# Patient Record
Sex: MALE | Race: BLACK OR AFRICAN AMERICAN | Employment: FULL TIME | URBAN - METROPOLITAN AREA
[De-identification: names, ages, dates, MRNs, and addresses within clinical notes are randomized per-mention and may not be internally consistent; named-entity substitution may affect disease eponyms.]

---

## 2024-05-08 ENCOUNTER — APPOINTMENT (OUTPATIENT)
Dept: GENERAL RADIOLOGY | Age: 49
End: 2024-05-08
Payer: COMMERCIAL

## 2024-05-08 ENCOUNTER — HOSPITAL ENCOUNTER (EMERGENCY)
Age: 49
Discharge: HOME | End: 2024-05-08
Attending: EMERGENCY MEDICINE
Payer: COMMERCIAL

## 2024-05-08 VITALS
WEIGHT: 170 LBS | BODY MASS INDEX: 27.32 KG/M2 | TEMPERATURE: 98.2 F | OXYGEN SATURATION: 96 % | HEIGHT: 66 IN | RESPIRATION RATE: 19 BRPM | DIASTOLIC BLOOD PRESSURE: 107 MMHG | SYSTOLIC BLOOD PRESSURE: 158 MMHG | HEART RATE: 75 BPM

## 2024-05-08 DIAGNOSIS — E86.9 VOLUME DEPLETION: ICD-10-CM

## 2024-05-08 DIAGNOSIS — M62.82 NON-TRAUMATIC RHABDOMYOLYSIS: Primary | ICD-10-CM

## 2024-05-08 LAB
ALBUMIN SERPL-MCNC: 3.9 G/DL (ref 3.5–5)
ALBUMIN/GLOB SERPL: 1.3 (ref 1–1.9)
ALP SERPL-CCNC: 61 U/L (ref 40–129)
ALT SERPL-CCNC: 24 U/L (ref 12–65)
ANION GAP SERPL CALC-SCNC: 14 MMOL/L (ref 9–18)
AST SERPL-CCNC: 38 U/L (ref 15–37)
BASOPHILS # BLD: 0 K/UL (ref 0–0.2)
BASOPHILS NFR BLD: 0 % (ref 0–2)
BILIRUB SERPL-MCNC: 0.9 MG/DL (ref 0–1.2)
BUN SERPL-MCNC: 11 MG/DL (ref 6–23)
CALCIUM SERPL-MCNC: 9.3 MG/DL (ref 8.8–10.2)
CHLORIDE SERPL-SCNC: 103 MMOL/L (ref 98–107)
CK SERPL-CCNC: 1416 U/L (ref 21–215)
CO2 SERPL-SCNC: 21 MMOL/L (ref 20–28)
CREAT SERPL-MCNC: 1.3 MG/DL (ref 0.8–1.3)
DIFFERENTIAL METHOD BLD: ABNORMAL
EKG ATRIAL RATE: 77 BPM
EKG DIAGNOSIS: NORMAL
EKG P AXIS: 62 DEGREES
EKG P-R INTERVAL: 137 MS
EKG Q-T INTERVAL: 364 MS
EKG QRS DURATION: 130 MS
EKG QTC CALCULATION (BAZETT): 410 MS
EKG R AXIS: 250 DEGREES
EKG T AXIS: -54 DEGREES
EKG VENTRICULAR RATE: 76 BPM
EOSINOPHIL # BLD: 0 K/UL (ref 0–0.8)
EOSINOPHIL NFR BLD: 0 % (ref 0.5–7.8)
ERYTHROCYTE [DISTWIDTH] IN BLOOD BY AUTOMATED COUNT: 12.7 % (ref 11.9–14.6)
FLUAV RNA SPEC QL NAA+PROBE: NOT DETECTED
FLUBV RNA SPEC QL NAA+PROBE: NOT DETECTED
GLOBULIN SER CALC-MCNC: 3 G/DL (ref 2.3–3.5)
GLUCOSE SERPL-MCNC: 92 MG/DL (ref 70–99)
HCT VFR BLD AUTO: 46 % (ref 41.1–50.3)
HGB BLD-MCNC: 15.8 G/DL (ref 13.6–17.2)
IMM GRANULOCYTES # BLD AUTO: 0 K/UL (ref 0–0.5)
IMM GRANULOCYTES NFR BLD AUTO: 0 % (ref 0–5)
LIPASE SERPL-CCNC: 14 U/L (ref 13–60)
LYMPHOCYTES # BLD: 1.2 K/UL (ref 0.5–4.6)
LYMPHOCYTES NFR BLD: 17 % (ref 13–44)
MAGNESIUM SERPL-MCNC: 2 MG/DL (ref 1.8–2.4)
MCH RBC QN AUTO: 29.6 PG (ref 26.1–32.9)
MCHC RBC AUTO-ENTMCNC: 34.3 G/DL (ref 31.4–35)
MCV RBC AUTO: 86.1 FL (ref 82–102)
MONOCYTES # BLD: 0.4 K/UL (ref 0.1–1.3)
MONOCYTES NFR BLD: 6 % (ref 4–12)
NEUTS SEG # BLD: 5.3 K/UL (ref 1.7–8.2)
NEUTS SEG NFR BLD: 76 % (ref 43–78)
NRBC # BLD: 0 K/UL (ref 0–0.2)
NT PRO BNP: 1800 PG/ML (ref 0–125)
PLATELET # BLD AUTO: 279 K/UL (ref 150–450)
PMV BLD AUTO: 10.7 FL (ref 9.4–12.3)
POTASSIUM SERPL-SCNC: 3.9 MMOL/L (ref 3.5–5.1)
PROT SERPL-MCNC: 6.9 G/DL (ref 6.3–8.2)
RBC # BLD AUTO: 5.34 M/UL (ref 4.23–5.6)
SARS-COV-2 RDRP RESP QL NAA+PROBE: NOT DETECTED
SODIUM SERPL-SCNC: 138 MMOL/L (ref 136–145)
SOURCE: NORMAL
TROPONIN T SERPL HS-MCNC: 36 NG/L (ref 0–22)
TROPONIN T SERPL HS-MCNC: 39 NG/L (ref 0–22)
WBC # BLD AUTO: 7 K/UL (ref 4.3–11.1)

## 2024-05-08 PROCEDURE — 83880 ASSAY OF NATRIURETIC PEPTIDE: CPT

## 2024-05-08 PROCEDURE — 83690 ASSAY OF LIPASE: CPT

## 2024-05-08 PROCEDURE — 83735 ASSAY OF MAGNESIUM: CPT

## 2024-05-08 PROCEDURE — 71046 X-RAY EXAM CHEST 2 VIEWS: CPT

## 2024-05-08 PROCEDURE — 87502 INFLUENZA DNA AMP PROBE: CPT

## 2024-05-08 PROCEDURE — 96375 TX/PRO/DX INJ NEW DRUG ADDON: CPT

## 2024-05-08 PROCEDURE — 84484 ASSAY OF TROPONIN QUANT: CPT

## 2024-05-08 PROCEDURE — 99285 EMERGENCY DEPT VISIT HI MDM: CPT

## 2024-05-08 PROCEDURE — 2580000003 HC RX 258: Performed by: EMERGENCY MEDICINE

## 2024-05-08 PROCEDURE — 93010 ELECTROCARDIOGRAM REPORT: CPT | Performed by: INTERNAL MEDICINE

## 2024-05-08 PROCEDURE — 93005 ELECTROCARDIOGRAM TRACING: CPT | Performed by: EMERGENCY MEDICINE

## 2024-05-08 PROCEDURE — 80053 COMPREHEN METABOLIC PANEL: CPT

## 2024-05-08 PROCEDURE — 96374 THER/PROPH/DIAG INJ IV PUSH: CPT

## 2024-05-08 PROCEDURE — 82550 ASSAY OF CK (CPK): CPT

## 2024-05-08 PROCEDURE — 6360000002 HC RX W HCPCS: Performed by: EMERGENCY MEDICINE

## 2024-05-08 PROCEDURE — 87635 SARS-COV-2 COVID-19 AMP PRB: CPT

## 2024-05-08 PROCEDURE — 85025 COMPLETE CBC W/AUTO DIFF WBC: CPT

## 2024-05-08 RX ORDER — ONDANSETRON 2 MG/ML
4 INJECTION INTRAMUSCULAR; INTRAVENOUS
Status: COMPLETED | OUTPATIENT
Start: 2024-05-08 | End: 2024-05-08

## 2024-05-08 RX ORDER — 0.9 % SODIUM CHLORIDE 0.9 %
1000 INTRAVENOUS SOLUTION INTRAVENOUS
Status: COMPLETED | OUTPATIENT
Start: 2024-05-08 | End: 2024-05-08

## 2024-05-08 RX ORDER — KETOROLAC TROMETHAMINE 30 MG/ML
30 INJECTION, SOLUTION INTRAMUSCULAR; INTRAVENOUS ONCE
Status: COMPLETED | OUTPATIENT
Start: 2024-05-08 | End: 2024-05-08

## 2024-05-08 RX ADMIN — ONDANSETRON 4 MG: 2 INJECTION INTRAMUSCULAR; INTRAVENOUS at 08:32

## 2024-05-08 RX ADMIN — SODIUM CHLORIDE 1000 ML: 9 INJECTION, SOLUTION INTRAVENOUS at 08:32

## 2024-05-08 RX ADMIN — KETOROLAC TROMETHAMINE 30 MG: 30 INJECTION, SOLUTION INTRAMUSCULAR at 08:32

## 2024-05-08 RX ADMIN — SODIUM CHLORIDE 1000 ML: 9 INJECTION, SOLUTION INTRAVENOUS at 10:26

## 2024-05-08 ASSESSMENT — PAIN SCALES - GENERAL: PAINLEVEL_OUTOF10: 6

## 2024-05-08 ASSESSMENT — PAIN - FUNCTIONAL ASSESSMENT: PAIN_FUNCTIONAL_ASSESSMENT: 0-10

## 2024-05-08 ASSESSMENT — PAIN DESCRIPTION - LOCATION: LOCATION: GENERALIZED

## 2024-05-08 NOTE — DISCHARGE INSTRUCTIONS
As we discussed, it appears as though some of your body aches and muscle cramping is likely due to some mild muscle breakdown from working hard in getting slightly dehydrated.  Important that you stay hydrated.  Return to the emergency department for any worsening of symptoms.  When you get home, obtain an appointment to follow-up in 1 week with your primary care doctor to ensure that you are improving.  If your symptoms worsen or change in any way please return to the ER for further evaluation.

## 2024-05-08 NOTE — ED TRIAGE NOTES
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 Patient denies any further known complaints at this time.

## 2024-05-08 NOTE — ED PROVIDER NOTES
Emergency Department Provider Note       PCP: No primary care provider on file.   Age: 48 y.o.   Sex: male     DISPOSITION Decision To Discharge 05/08/2024 11:13:42 AM       ICD-10-CM    1. Non-traumatic rhabdomyolysis  M62.82       2. Volume depletion  E86.9           Medical Decision Making     11:14 AM EDT  I was able to review the patient's past medical history, he did have a hospitalization where he had a stress echo that was abnormal and ultimately had a left heart cath which was notable for some nonobstructive coronary artery disease.  However he was noted to have a decreased ejection fraction at that time of around 40%.    In the emergency department today, his cardiac evaluation is reassuring.  He has a indeterminately elevated troponin which is stable with repeat testing which I think is likely due to his reduced EF.  Similarly I think his slightly elevated BNP is related to this as well.  I do not see any evidence of acute congestive heart failure.  Patient is noted to have some nonspecific T wave inversions, I do not think this represents any acute process as he is having no chest pain no shortness of breath.  I suspect that this is likely chronic though I do not have old EKGs for comparison.    Patient's labs are notable for slight elevation in CK.  I think he may have very mild rhabdomyolysis causing his muscle aches and cramping.  He is received a liter of fluid here in the emergency department.  And has had improvement in his symptoms.  Will place the patient off of work for the next couple of days to allow him time to recover.  His viral studies are negative.  I recommended hydration, return to the ER for worsening symptoms.  He is comfortable this plan understands reasons to return.     1 or more acute illnesses that pose a threat to life or bodily function.   Over the counter drug management performed.    I independently ordered and reviewed each unique test.  I reviewed external records: